# Patient Record
(demographics unavailable — no encounter records)

---

## 2025-03-04 NOTE — HISTORY OF PRESENT ILLNESS
[FreeTextEntry1] : Lindsay is a very pleasant 76 -year-old female with Hx of DM, HTN, Dyslipidemia, CAD patient came for cardiac testing    follow up.  She  denies any exertional chest pain. Denies PND, orthopnea, diaphoresis, dizziness, or palpitations, and pedal edema.  She gets SOB on exertion.She remains active.  Patient is losing weight but she stated that she is strictly following her diet  November 30, 2024   CBC normal, CMP normal except glucose 195, she does have proteinuria. April 9, 2024   PELON from outside facility confirmed PAD Colonoscopy in 2023 -no pathology September 11 , 2023    echocardiogram showed mild concentric LVH, hyperdynamic LV with LVEF 70%, intracavitary gradient 31 mmHg with Valsalva, mild mitral stenosis, PASP 30 mmHg. September 11, 2023   carotid Doppler showed minimal disease. August 24, 2023          Lexiscan myocardial perfusion scan did not show inducible ischemia.   On  August 26, 2021, she did not answer to her daughter's phone, daughter went to visit her, apparently she had slurring her speech , dysarthria ,difficulty finding words,  feeling lightheaded, also she was vomiting.  She was taken to [facility] where work-up confirmed decreased caliber of left cervical vertebral artery with foci of moderate to severe stenosis may represent dissection versus noncalcified atherosclerotic plaques.  She also had headache on admission with nausea.  MRA of the neck demonstrated dissection involving major variety of the left vertebral artery, MRA of head and neck. was negative.  MRI of the brain without contrast confirmed acute infarct involving the left occipital lobe.  She also had right visual field cut, gait instability-, she did require Cardene drip briefly.  She did not receive TPA for stroke.  In the hospital  which is always elevated in spite of medications.. She had echocardiogram at [facility] with confirmed LVEF 79% with hyperdynamic LV and mild diastolic dysfunction without significant valvulopathy.  There is a question of possible cardiac arrhythmias and there is a discussion about loop placement outpatient.  She was admitted to [facility] August 7, 2020 for CVA nausea and vomiting for 24 hours, she also had diarrhea; there was no chest pain; troponin was elevated.  CT abdomen pelvis confirmed mild diffuse wall thickening of the colon.  Echocardiogram confirmed normal size, thickness and wall motion, LVEF.  They did suggest cardiac catheterization in the beginning due to elevated troponin but subsequent or pointing trended down without any chest pain and it was not done.  Now she complains of bilateral leg pain but no chest pain.  She cannot afford   Vascepa.  The patient is compliant to medication, diabetes not well controlled.   She is s/p  bilateral cataracts , her vision has improved significantly. She was told to have DM Retinopathy , she does follow Retinologist.  She has been diagnosed to have DM Gastroparesis.  Her CAD risk factor includes her age, diabetes, hypertension, and dyslipidemia. Denies smoking or significant family history of premature coronary artery disease.

## 2025-03-04 NOTE — PHYSICAL EXAM
[General Appearance - Well Developed] : well developed [Normal Appearance] : normal appearance [Well Groomed] : well groomed [General Appearance - Well Nourished] : well nourished [No Deformities] : no deformities [General Appearance - In No Acute Distress] : no acute distress [Normal Conjunctiva] : the conjunctiva exhibited no abnormalities [Eyelids - No Xanthelasma] : the eyelids demonstrated no xanthelasmas [Normal Oral Mucosa] : normal oral mucosa [No Oral Pallor] : no oral pallor [No Oral Cyanosis] : no oral cyanosis [Normal Jugular Venous A Waves Present] : normal jugular venous A waves present [Normal Jugular Venous V Waves Present] : normal jugular venous V waves present [No Jugular Venous [place] A Waves] : no jugular venous lagunas A waves [Respiration, Rhythm And Depth] : normal respiratory rhythm and effort [Exaggerated Use Of Accessory Muscles For Inspiration] : no accessory muscle use [Auscultation Breath Sounds / Voice Sounds] : lungs were clear to auscultation bilaterally [Heart Rate And Rhythm] : heart rate and rhythm were normal [Heart Sounds] : normal S1 and S2 [Murmurs] : no murmurs present [Abdomen Soft] : soft [Abdomen Tenderness] : non-tender [Abdomen Mass (___ Cm)] : no abdominal mass palpated [Abnormal Walk] : normal gait [Gait - Sufficient For Exercise Testing] : the gait was sufficient for exercise testing [Nail Clubbing] : no clubbing of the fingernails [Cyanosis, Localized] : no localized cyanosis [Petechial Hemorrhages (___cm)] : no petechial hemorrhages [Skin Color & Pigmentation] : normal skin color and pigmentation [] : no rash [No Venous Stasis] : no venous stasis [Skin Lesions] : no skin lesions [No Skin Ulcers] : no skin ulcer [No Xanthoma] : no  xanthoma was observed [Oriented To Time, Place, And Person] : oriented to person, place, and time [Affect] : the affect was normal [Mood] : the mood was normal [No Anxiety] : not feeling anxious

## 2025-03-04 NOTE — ASSESSMENT
[FreeTextEntry1] : Left occipital lobe infarct -she did not receive TPA ; at [place] there was concern about possible cardiac arrhythmias, she was advised for loop implant.  Continue Plavix.  Continue aspirin  CAD, status post LAD stenting without recurrent episodes of chest pain. She does get short of breath on more than usual exertion.   Lexiscan nuclear stress test did not show any  inducible  Ischemia.. Echo confirmed noramal LV wall motion and LVEF.  Dyslipidemia. Not meeting NCEP goal,  low cholesterol diet has been discussed..Continue  Lipitor , strict diet;  She could not tolerate   Tricor .  Continue current Lipitor 80 mg and Zetia  10 mg daily, lipid panel in 6 months.  Diabetes. Diet has been discussed. Her goals of blood pressure less than 130/80 and A1c less than 7. LDL less than 70.  Continue Jardiance 20 mg daily  Leg pain -she has appointment with a neurologist.  Hypertension, well controlled. Low salt, the patient does not have any salt. She understands the importance of control of hypertension to prevent end organ damage.   PAD -PELON has been requested  Decreased carotid pulses -Doppler has been quested  Aggressive risk factor modification has been discussed with her at a great length. She will be reevaluated by me in 6 months.  CBC, CMP normal on February 23, 2024.   She will be reeval by me after cardiac testing.

## 2025-04-09 NOTE — ASSESSMENT
[FreeTextEntry1] : Left occipital lobe infarct -she did not receive TPA ; at [place] there was concern about possible cardiac arrhythmias, she was advised for loop implant.  Continue Plavix.  Continue aspirin  CAD, status post LAD stenting without recurrent episodes of chest pain. She does get short of breath on more than usual exertion.   Lexiscan nuclear stress test did not show any  inducible  Ischemia.. Echo confirmed noramal LV wall motion and LVEF.  Dyslipidemia. Not meeting NCEP goal,  low cholesterol diet has been discussed..Continue  Lipitor , strict diet;  She could not tolerate   Tricor .  Continue current Lipitor 80 mg and Zetia  10 mg daily, lipid panel in 6 months.  Diabetes. Diet has been discussed. Her goals of blood pressure less than 130/80 and A1c less than 7. LDL less than 70.  Continue Jardiance 20 mg daily  Leg pain -she has appointment with a neurologist.  Hypertension, well controlled. Low salt, the patient does not have any salt. She understands the importance of control of hypertension to prevent end organ damage.   PAD -PELON has been requested in 4 months after rehabilitation.  I educated the patient face-to-face on PAD regarding complications, signs and symptoms and treatment plans.  She understands importance of rehabilitation to prevent complicated PAD.  And she is agreeable  Decreased carotid pulses -Doppler has been quested  Aggressive risk factor modification has been discussed with her at a great length. She will be reevaluated by me in 6 months.  CBC, CMP normal on February 23, 2024.   She will be reeval by me after PELON in 4 months after rehabilitation.

## 2025-04-09 NOTE — HISTORY OF PRESENT ILLNESS
[FreeTextEntry1] : Lindsay is a very pleasant 76 -year-old female with Hx of DM, HTN, Dyslipidemia,, CVA, PAD, CAD patient came for cardiac testing    follow up.  She  denies any exertional chest pain. Denies PND, orthopnea, diaphoresis, dizziness, or palpitations, and pedal edema.  She gets SOB on exertion. She remains active.  Patient is losing weight but she stated that she is strictly following her diet.  She complains of leg fatigue on exertion also typical claudication requiring discontinuation of walking in stride she is on her stationary bike every day for exercise purposes.  April 9, 2025       carotid Doppler showed mild nonobstructive disease. November 30, 2024   CBC normal, CMP normal except glucose 195, she does have proteinuria. April 9, 2024   PELON from outside facility confirmed PAD Colonoscopy in 2023 -no pathology September 11 , 2023    echocardiogram showed mild concentric LVH, hyperdynamic LV with LVEF 70%, intracavitary gradient 31 mmHg with Valsalva, mild mitral stenosis, PASP 30 mmHg. August 24, 2023          Lexiscan myocardial perfusion scan did not show inducible ischemia.   On  August 26, 2021, she did not answer to her daughter's phone, daughter went to visit her, apparently she had slurring her speech , dysarthria ,difficulty finding words,  feeling lightheaded, also she was vomiting.  She was taken to [facility] where work-up confirmed decreased caliber of left cervical vertebral artery with foci of moderate to severe stenosis may represent dissection versus noncalcified atherosclerotic plaques.  She also had headache on admission with nausea.  MRA of the neck demonstrated dissection involving major variety of the left vertebral artery, MRA of head and neck. was negative.  MRI of the brain without contrast confirmed acute infarct involving the left occipital lobe.  She also had right visual field cut, gait instability-, she did require Cardene drip briefly.  She did not receive TPA for stroke.  In the hospital  which is always elevated in spite of medications.. She had echocardiogram at [facility] with confirmed LVEF 79% with hyperdynamic LV and mild diastolic dysfunction without significant valvulopathy.  There is a question of possible cardiac arrhythmias and there is a discussion about loop placement outpatient.  She was admitted to [facility] August 7, 2020 for CVA nausea and vomiting for 24 hours, she also had diarrhea; there was no chest pain; troponin was elevated.  CT abdomen pelvis confirmed mild diffuse wall thickening of the colon.  Echocardiogram confirmed normal size, thickness and wall motion, LVEF.  They did suggest cardiac catheterization in the beginning due to elevated troponin but subsequent or pointing trended down without any chest pain and it was not done.  Now she complains of bilateral leg pain but no chest pain.  She cannot afford   Vascepa.  The patient is compliant to medication, diabetes not well controlled.   She is s/p  bilateral cataracts , her vision has improved significantly. She was told to have DM Retinopathy , she does follow Retinologist.  She has been diagnosed to have DM Gastroparesis.  Her CAD risk factor includes her age, diabetes, hypertension, and dyslipidemia. Denies smoking or significant family history of premature coronary artery disease.